# Patient Record
Sex: FEMALE | Race: WHITE | NOT HISPANIC OR LATINO | URBAN - METROPOLITAN AREA
[De-identification: names, ages, dates, MRNs, and addresses within clinical notes are randomized per-mention and may not be internally consistent; named-entity substitution may affect disease eponyms.]

---

## 2018-10-11 ENCOUNTER — OFFICE VISIT - RIVER FALLS (OUTPATIENT)
Dept: FAMILY MEDICINE | Facility: CLINIC | Age: 58
End: 2018-10-11

## 2022-02-12 VITALS
SYSTOLIC BLOOD PRESSURE: 122 MMHG | WEIGHT: 243 LBS | HEART RATE: 80 BPM | TEMPERATURE: 97.2 F | DIASTOLIC BLOOD PRESSURE: 72 MMHG

## 2022-02-15 NOTE — PROGRESS NOTES
"Chief Complaint    Dizzy/ light headedness when eyes are open, then turns into nausea, started sometime last night.  History of Present Illness      Patient here concerned with being light headed, and dizzy. She said that she had not hit her head. Did have a headache on Wednesday, no change in vision/hearing, no ringing in ears. Spinning while laying in bed. Symptoms are worse with head movement especially in the AP plane.  Has mild nausea, no vomiting, denies other current neurological symptoms  Review of Systems        \"See HPI.  All other review of systems negative.\"  Physical Exam   Vitals & Measurements    T: 97.2   F (Tympanic)  HR: 80(Peripheral)  BP: 122/72     WT: 243 lb           General:  Alert and oriented, appears uncomfortable           Eye:  Pupils are equal, round and reactive to light, Extraocular movements are intact, Normal conjunctiva.            HENT:  Normocephalic, Tympanic membranes are clear, Normal hearing, Oral mucosa is moist, No pharyngeal erythema.            Neck:  Supple, Non-tender.            Respiratory:  Respirations are non-labored.            Cardiovascular:  Normal rate, Regular rhythm.              Musculoskeletal: Normal range of motion, Normal strength, No swelling, No deformity.          Neurologic:  Alert, Oriented, Normal sensory, Normal motor function, Positive Romberg, normal FNF, heel/shin, Normal deep tendon reflexes.            Psychiatric:  Cooperative, Appropriate mood & affect, Normal judgment  Assessment/Plan       Acute labyrinthitis (H83.09)         Lorazepam 1 mg every 3-4,  Follow up if symptoms worsen or do not improve in 24 hrs.         if no improvement then can have order for physical therapy.      I, Whit Loera LPN, acted solely as a scribe for, and in the presence of Dr. Malik Pinto who performed the services.  Patient Information     Name:JOSE KIMBLE      Address:      [address] [place] [id number]     Sex:Female     Date of " Birth:1960     [phone]     Emergency Contact:JUSTINO KIMBLE     MRN:[id number]     FIN:[id number]     Location:[facility]     Date of Service:10/11/2018      Primary Care Physician:       Malik Pinto MD, [phone]      Attending Physician:       Malik Pinto MD, [phone]  Problem List/Past Medical History    Ongoing     Obesity    Historical     Menorrhagia       Comments: Resulting in anemia.  Procedure/Surgical History     childbirth            Comments:      X 2     Laparoscopic supracervical hysterectomy        Medications     LORazepam 1 mg oral tablet: 1 mg, 1 tab(s), Oral, q3-4 hrs, 24 tab(s), 1 Refill(s).          Allergies    No known allergies  Social History    Smoking Status - 04/10/2012     No     Alcohol - Current, 03/15/2012      Current, 3-5 times per week, 2 drinks/episode average., 03/15/2012     Exercise and Physical Activity - Regular exercise, 03/15/2012      Exercise frequency: 2-3 times. Exercise type: Walking, core training., 03/15/2012     Tobacco - Denies Tobacco Use, 03/15/2012  Family History    Aneurysm: Father.    Arthritis: Sister.    CVA - Cerebrovascular accident: Mother.    Uterine fibroids: Mother and Sister.  Immunizations      Vaccine Date Status Comments      influenza 10/02/2017 Recorded [10/4/2017] Received at [facility], [place], [place]